# Patient Record
Sex: FEMALE | Race: OTHER | Employment: UNEMPLOYED | URBAN - METROPOLITAN AREA
[De-identification: names, ages, dates, MRNs, and addresses within clinical notes are randomized per-mention and may not be internally consistent; named-entity substitution may affect disease eponyms.]

---

## 2022-06-12 ENCOUNTER — HOSPITAL ENCOUNTER (EMERGENCY)
Age: 42
Discharge: HOME | End: 2022-06-12
Attending: EMERGENCY MEDICINE
Payer: MEDICAID

## 2022-06-12 ENCOUNTER — APPOINTMENT (OUTPATIENT)
Dept: GENERAL RADIOLOGY | Age: 42
End: 2022-06-12
Attending: PHYSICIAN ASSISTANT
Payer: MEDICAID

## 2022-06-12 VITALS
TEMPERATURE: 98.3 F | SYSTOLIC BLOOD PRESSURE: 107 MMHG | WEIGHT: 216.71 LBS | OXYGEN SATURATION: 99 % | RESPIRATION RATE: 18 BRPM | HEART RATE: 86 BPM | DIASTOLIC BLOOD PRESSURE: 72 MMHG | HEIGHT: 68 IN | BODY MASS INDEX: 32.84 KG/M2

## 2022-06-12 DIAGNOSIS — S89.91XA KNEE INJURY, RIGHT, INITIAL ENCOUNTER: Primary | ICD-10-CM

## 2022-06-12 DIAGNOSIS — S92.405A CLOSED NONDISPLACED FRACTURE OF PHALANX OF LEFT GREAT TOE, UNSPECIFIED PHALANX, INITIAL ENCOUNTER: ICD-10-CM

## 2022-06-12 DIAGNOSIS — M25.461 EFFUSION OF RIGHT KNEE JOINT: ICD-10-CM

## 2022-06-12 PROCEDURE — 73562 X-RAY EXAM OF KNEE 3: CPT

## 2022-06-12 PROCEDURE — 99283 EMERGENCY DEPT VISIT LOW MDM: CPT

## 2022-06-12 PROCEDURE — 73660 X-RAY EXAM OF TOE(S): CPT

## 2022-06-12 PROCEDURE — 74011250637 HC RX REV CODE- 250/637: Performed by: PHYSICIAN ASSISTANT

## 2022-06-12 RX ORDER — NAPROXEN 500 MG/1
500 TABLET ORAL
Qty: 20 TABLET | Refills: 0 | Status: SHIPPED | OUTPATIENT
Start: 2022-06-12

## 2022-06-12 RX ORDER — NAPROXEN 250 MG/1
500 TABLET ORAL
Status: COMPLETED | OUTPATIENT
Start: 2022-06-12 | End: 2022-06-12

## 2022-06-12 RX ADMIN — NAPROXEN 500 MG: 250 TABLET ORAL at 13:07

## 2022-06-12 NOTE — ED PROVIDER NOTES
38 yo F with no significant PMH here for evaluation after fall that occurred yesterday. States she slipped on water at home and injured Rt Knee and L great toe. Pain to same. Took Motrin yesterday. Denies striking head or neck; no LOC; no N/V or changes in mentation. The history is provided by the patient. Fall  This is a new problem. The current episode started yesterday. The problem occurs constantly. Pertinent negatives include no chest pain, no abdominal pain, no headaches and no shortness of breath. The symptoms are aggravated by walking, standing and exertion. Knee Pain  Pertinent negatives include no chest pain, no abdominal pain, no headaches and no shortness of breath. History reviewed. No pertinent past medical history. History reviewed. No pertinent surgical history. History reviewed. No pertinent family history. Social History     Socioeconomic History    Marital status: Not on file     Spouse name: Not on file    Number of children: Not on file    Years of education: Not on file    Highest education level: Not on file   Occupational History    Not on file   Tobacco Use    Smoking status: Never Smoker    Smokeless tobacco: Not on file   Substance and Sexual Activity    Alcohol use: Not on file    Drug use: Not on file    Sexual activity: Not on file   Other Topics Concern    Not on file   Social History Narrative    Not on file     Social Determinants of Health     Financial Resource Strain:     Difficulty of Paying Living Expenses: Not on file   Food Insecurity:     Worried About Running Out of Food in the Last Year: Not on file    Bina of Food in the Last Year: Not on file   Transportation Needs:     Lack of Transportation (Medical): Not on file    Lack of Transportation (Non-Medical):  Not on file   Physical Activity:     Days of Exercise per Week: Not on file    Minutes of Exercise per Session: Not on file   Stress:     Feeling of Stress : Not on file   Social Connections:     Frequency of Communication with Friends and Family: Not on file    Frequency of Social Gatherings with Friends and Family: Not on file    Attends Evangelical Services: Not on file    Active Member of Clubs or Organizations: Not on file    Attends Club or Organization Meetings: Not on file    Marital Status: Not on file   Intimate Partner Violence:     Fear of Current or Ex-Partner: Not on file    Emotionally Abused: Not on file    Physically Abused: Not on file    Sexually Abused: Not on file   Housing Stability:     Unable to Pay for Housing in the Last Year: Not on file    Number of [place] in the Last Year: Not on file    Unstable Housing in the Last Year: Not on file         ALLERGIES: Patient has no known allergies. Review of Systems   Constitutional: Negative. HENT: Negative for ear discharge. Eyes: Negative for photophobia, pain, discharge and visual disturbance. Respiratory: Negative for apnea, cough, chest tightness and shortness of breath. Cardiovascular: Negative for chest pain, palpitations and leg swelling. Gastrointestinal: Negative for abdominal distention, abdominal pain and blood in stool. Genitourinary: Negative for difficulty urinating, dysuria, flank pain, frequency and hematuria. Musculoskeletal: Positive for myalgias. Negative for back pain and neck pain. Skin: Negative for color change and pallor. Neurological: Negative for dizziness, syncope, weakness, numbness and headaches. Psychiatric/Behavioral: Negative for behavioral problems and confusion. The patient is not nervous/anxious. Vitals:    06/12/22 1239   BP: 107/72   Pulse: 86   Resp: 18   Temp: 98.3 °F (36.8 °C)   SpO2: 99%   Weight: 98.3 kg (216 lb 11.4 oz)   Height: 5' 8.11\" (1.73 m)            Physical Exam  Vitals and nursing note reviewed. Constitutional:       Appearance: She is well-developed. HENT:      Head: Normocephalic and atraumatic. Right Ear: External ear normal.      Left Ear: External ear normal.      Nose: Nose normal.   Eyes:      General:         Right eye: No discharge. Left eye: No discharge. Conjunctiva/sclera: Conjunctivae normal.      Pupils: Pupils are equal, round, and reactive to light. Cardiovascular:      Rate and Rhythm: Normal rate and regular rhythm. Heart sounds: Normal heart sounds. Pulmonary:      Effort: Pulmonary effort is normal.      Breath sounds: Normal breath sounds. Abdominal:      General: Bowel sounds are normal. There is no distension. Palpations: Abdomen is soft. Tenderness: There is no abdominal tenderness. There is no guarding or rebound. Musculoskeletal:         General: Swelling, tenderness and signs of injury present. Normal range of motion. Cervical back: Normal range of motion and neck supple. Right hip: Normal.      Right upper leg: Normal.      Right knee: Swelling present. Tenderness present. Normal pulse. Right ankle: Normal.      Left foot: Tenderness and bony tenderness present. No laceration. Normal pulse. Feet:    Skin:     General: Skin is warm and dry. Findings: No rash. Neurological:      Mental Status: She is alert and oriented to person, place, and time. Cranial Nerves: No cranial nerve deficit. Coordination: Coordination normal.   Psychiatric:         Behavior: Behavior normal.         Thought Content: Thought content normal.         Judgment: Judgment normal.          MDM  Number of Diagnoses or Management Options     Amount and/or Complexity of Data Reviewed  Tests in the radiology section of CPT®: ordered and reviewed  Discuss the patient with other providers: yes  Independent visualization of images, tracings, or specimens: yes           Procedures      Patient has been reassessed. Reviewed medications and radiographics with patient. Ready to discharge home. Aware of concern for ligamental injury;  Will follow up with Ortho. Discussed case with attending Physician. Agrees with care and will D/C with follow up. Patient's results have been reviewed with them. Patient and/or family have verbally conveyed their understanding and agreement of the patient's signs, symptoms, diagnosis, treatment and prognosis and additionally agree to follow up as recommended or return to the Emergency Room should their condition change prior to follow-up. Discharge instructions have also been provided to the patient with some educational information regarding their diagnosis as well a list of reasons why they would want to return to the ER prior to their follow-up appointment should their condition change.   GOGO Yarbrough

## 2022-06-12 NOTE — ED TRIAGE NOTES
Patient presents to triage via [facility]. Pt drove herself here and walked into department. Pt reports she slipped on water at her home yesterday. Pt complains of RIGHT knee pain, and LEFT great toe pain after fall. Denies LOC, denies hitting her head. Pt reports she took ibuprofen yesterday for pain with relief.  PMS intact

## 2022-06-13 ENCOUNTER — OFFICE VISIT (OUTPATIENT)
Dept: ORTHOPEDIC SURGERY | Age: 42
End: 2022-06-13
Payer: MEDICAID

## 2022-06-13 VITALS — WEIGHT: 200 LBS | BODY MASS INDEX: 30.31 KG/M2 | HEIGHT: 68 IN

## 2022-06-13 DIAGNOSIS — S83.241A ACUTE MEDIAL MENISCUS TEAR OF RIGHT KNEE, INITIAL ENCOUNTER: Primary | ICD-10-CM

## 2022-06-13 DIAGNOSIS — S92.415A NONDISPLACED FRACTURE OF PROXIMAL PHALANX OF LEFT GREAT TOE, INITIAL ENCOUNTER FOR CLOSED FRACTURE: ICD-10-CM

## 2022-06-13 PROCEDURE — 28490 TREAT BIG TOE FRACTURE: CPT | Performed by: ORTHOPAEDIC SURGERY

## 2022-06-13 PROCEDURE — 99203 OFFICE O/P NEW LOW 30 MIN: CPT | Performed by: ORTHOPAEDIC SURGERY

## 2022-06-13 RX ORDER — TRAMADOL HYDROCHLORIDE 50 MG/1
50 TABLET ORAL
Qty: 28 TABLET | Refills: 0 | Status: SHIPPED | OUTPATIENT
Start: 2022-06-13 | End: 2022-06-20

## 2022-06-13 NOTE — PROGRESS NOTES
Tommy Youssef (: 1980) is a 37 y.o. female, new patient, here for evaluation of the following chief complaint(s):  Knee Pain (right knee) and Toe Pain (left great toe)       ASSESSMENT/PLAN:  Below is the assessment and plan developed based on review of pertinent history, physical exam, labs, studies, and medications. We discussed conservative treatment for her great toe fracture. She may weight-bear as tolerated in her hard soled shoe. We discussed buddy taping. We will plan for MRI of the right knee which will help with surgical treatment planning. I do have concern for meniscus tear based on her exam and injury mechanism. 1. Acute medial meniscus tear of right knee, initial encounter  -     traMADoL (ULTRAM) 50 mg tablet; Take 1 Tablet by mouth every six (6) hours as needed for Pain for up to 7 days. Max Daily Amount: 200 mg., Normal, Disp-28 Tablet, R-0  -     MRI KNEE RT WO CONT; Future  2. Nondisplaced fracture of proximal phalanx of left great toe, initial encounter for closed fracture      Return for imaging results after study performed. SUBJECTIVE/OBJECTIVE:  Tommy Youssef (: 1980) is a 37 y.o. female. She notes a fall that occurred recently. She injured her left great toe and her right knee. She describes sharp aching pain in the right knee with pivoting type movements and with ambulation. She notes swelling that occurred as well. She has tried ice and anti-inflammatories with minimal relief. She notes occasional clicking and buckling symptoms. Her left great toe was found to have a fracture on x-ray. She has been in a [facility]. No Known Allergies    Current Outpatient Medications   Medication Sig    traMADoL (ULTRAM) 50 mg tablet Take 1 Tablet by mouth every six (6) hours as needed for Pain for up to 7 days. Max Daily Amount: 200 mg.    naproxen (NAPROSYN) 500 mg tablet Take 1 Tablet by mouth every twelve (12) hours as needed for Pain.      No current facility-administered medications for this visit. Social History     Socioeconomic History    Marital status:      Spouse name: Not on file    Number of children: Not on file    Years of education: Not on file    Highest education level: Not on file   Occupational History    Not on file   Tobacco Use    Smoking status: Never Smoker    Smokeless tobacco: Never Used   Vaping Use    Vaping Use: Never used   Substance and Sexual Activity    Alcohol use: Never    Drug use: Never    Sexual activity: Not on file   Other Topics Concern    Not on file   Social History Narrative    Not on file     Social Determinants of Health     Financial Resource Strain:     Difficulty of Paying Living Expenses: Not on file   Food Insecurity:     Worried About [employer] [employer] in the Last Year: Not on file    Bina of Food in the Last Year: Not on file   Transportation Needs:     Lack of Transportation (Medical): Not on file    Lack of Transportation (Non-Medical): Not on file   Physical Activity:     Days of Exercise per Week: Not on file    Minutes of Exercise per Session: Not on file   Stress:     Feeling of Stress : Not on file   Social Connections:     Frequency of Communication with Friends and Family: Not on file    Frequency of Social Gatherings with Friends and Family: Not on file    Attends Sikhism Services: Not on file    Active Member of [address] [place] [address] or Organizations: Not on file    Attends Club or Organization Meetings: Not on file    Marital Status: Not on file   Intimate Partner Violence:     Fear of Current or Ex-Partner: Not on file    Emotionally Abused: Not on file    Physically Abused: Not on file    Sexually Abused: Not on file   Housing Stability:     Unable to Pay for Housing in the Last Year: Not on file    Number of [place] in the Last Year: Not on file    Unstable Housing in the Last Year: Not on file       History reviewed.  No pertinent surgical history. History reviewed. No pertinent family history. OB History    No obstetric history on file. REVIEW OF SYSTEMS:    Patient denies any recent fever, chills, nausea, vomiting, chest pain, or shortness of breath. Vitals:  Ht 5' 8\" (1.727 m)   Wt 200 lb (90.7 kg)   BMI 30.41 kg/m²    Body mass index is 30.41 kg/m². PHYSICAL EXAM:  General exam: Patient is awake, alert, and oriented x3. Well-appearing. No acute distress. Ambulates with an antalgic gait    Right knee: Neurovascular and sensory intact. There is tenderness to palpation along the medial joint line. Mild effusion is present. There is pain with Milli's maneuver. No obvious instability on ligamentous testing including Lachman's exam.  Stable anterior and posterior drawer. There is ecchymosis at the anterior knee. Left foot: There is tenderness palpation at the great toe. Pain in this area with range of motion. Mild swelling    IMAGING:    XR Results (most recent):  Results from Hospital Encounter encounter on 06/12/22    XR GREAT TOE LT MIN 2 V    Narrative  EXAM: XR GREAT TOE LT MIN 2 V    INDICATION: fall. COMPARISON: None. FINDINGS: Three views of the left great toe demonstrate an acute nondisplaced  obliquely oriented fracture through the distal shaft of the proximal phalanx of  the great toe. There is extension into the IP joint. There is overlying soft  tissue swelling    Impression  Acute intra-articular nondisplaced fracture of the proximal phalanx  of the great toe. XR KNEE RT 3 V    Narrative  EXAM: XR KNEE RT 3 V    INDICATION: fall. COMPARISON: None. FINDINGS: Three views of the right knee demonstrate no fracture or other acute  osseous or articular abnormality. There is a small joint effusion. 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 Impression  Small joint effusion with DJD. No fracture.          Orders Placed This Encounter    MRI KNEE RT WO CONT     Standing Status:   Future Standing Expiration Date:   7/13/2022     Order Specific Question:   Arthrogram study     Answer:   No     Order Specific Question:   Is Patient Pregnant? Answer:   No     Order Specific Question:   Reason for Exam     Answer:   RIGHT KNEE PAIN    traMADoL (ULTRAM) 50 mg tablet     Sig: Take 1 Tablet by mouth every six (6) hours as needed for Pain for up to 7 days. Max Daily Amount: 200 mg. Dispense:  28 Tablet     Refill:  0              An electronic signature was used to authenticate this note.   -- Keturah Katz, DO

## 2022-06-23 ENCOUNTER — OFFICE VISIT (OUTPATIENT)
Dept: ORTHOPEDIC SURGERY | Age: 42
End: 2022-06-23
Payer: MEDICAID

## 2022-06-23 VITALS — HEIGHT: 68 IN | WEIGHT: 200 LBS | BODY MASS INDEX: 30.31 KG/M2

## 2022-06-23 DIAGNOSIS — S83.511D NEW ACL TEAR, RIGHT, SUBSEQUENT ENCOUNTER: Primary | ICD-10-CM

## 2022-06-23 PROCEDURE — 99213 OFFICE O/P EST LOW 20 MIN: CPT | Performed by: ORTHOPAEDIC SURGERY

## 2022-06-23 NOTE — PROGRESS NOTES
Bret Hugo (: 1980) is a 37 y.o. female, established patient, here for evaluation of the following chief complaint(s):  Knee Pain       ASSESSMENT/PLAN:  Below is the assessment and plan developed based on review of pertinent history, physical exam, labs, studies, and medications. We discussed trying a trial of conservative treatment based on her MRI. If she has recurrent buckling symptoms we did discuss possibility of right knee arthroscopy with chondroplasty and possible ACL repair. 1. New ACL tear, right, subsequent encounter      Return if symptoms worsen or fail to improve. SUBJECTIVE/OBJECTIVE:  Bret Hugo (: 1980) is a 37 y.o. female. She notes that her knee has become less painful since her previous visit. She is starting ambulate without the crutch at times. She notes occasional clicking and aching pain in the knee. No Known Allergies    Current Outpatient Medications   Medication Sig    naproxen (NAPROSYN) 500 mg tablet Take 1 Tablet by mouth every twelve (12) hours as needed for Pain. No current facility-administered medications for this visit.        Social History     Socioeconomic History    Marital status:      Spouse name: Not on file    Number of children: Not on file    Years of education: Not on file    Highest education level: Not on file   Occupational History    Not on file   Tobacco Use    Smoking status: Never Smoker    Smokeless tobacco: Never Used   Vaping Use    Vaping Use: Never used   Substance and Sexual Activity    Alcohol use: Never    Drug use: Never    Sexual activity: Not on file   Other Topics Concern    Not on file   Social History Narrative    Not on file     Social Determinants of Health     Financial Resource Strain:     Difficulty of Paying Living Expenses: Not on file   Food Insecurity:     Worried About Running Out of Food in the Last Year: Not on file    Bina of Food in the Last Year: Not on file Transportation Needs:     Lack of Transportation (Medical): Not on file    Lack of Transportation (Non-Medical): Not on file   Physical Activity:     Days of Exercise per Week: Not on file    Minutes of Exercise per Session: Not on file   Stress:     Feeling of Stress : Not on file   Social Connections:     Frequency of Communication with Friends and Family: Not on file    Frequency of Social Gatherings with Friends and Family: Not on file    Attends [employer] Services: Not on file    Active Member of [address] [place] [address] [employer] or Organizations: Not on file    Attends Club or Organization Meetings: Not on file    Marital Status: Not on file   Intimate Partner Violence:     Fear of Current or Ex-Partner: Not on file    Emotionally Abused: Not on file    Physically Abused: Not on file    Sexually Abused: Not on file   Housing Stability:     Unable to Pay for Housing in the Last Year: Not on file    Number of [place] in the Last Year: Not on file    Unstable Housing in the Last Year: Not on file       History reviewed. No pertinent surgical history. History reviewed. No pertinent family history. OB History    No obstetric history on file. REVIEW OF SYSTEMS:  ROS     Positive for: Musculoskeletal    Last edited by Hitesh Lim on 6/23/2022  8:03 AM. (History)        Patient denies any recent fever, chills, nausea, vomiting, chest pain, or shortness of breath. Vitals:  Ht 5' 8\" (1.727 m)   Wt 200 lb (90.7 kg)   BMI 30.41 kg/m²    Body mass index is 30.41 kg/m². PHYSICAL EXAM:  General exam: Patient is awake, alert, and oriented x3. Well-appearing. No acute distress. Ambulates with a normal gait. Right knee: She does continue with some medial joint line tenderness palpation and pain with Milli's exam.  No significant effusion noted today.   Minimal laxity is noted on Lachman's exam.    IMAGING:  MRI Results (most recent):  Results from Appointment encounter on 06/17/22    MRI KNEE RT WO CONT    Narrative  EXAM: MRI KNEE RT WO CONT    INDICATION: Right knee pain    COMPARISON: Radiographs 6/12/2022    TECHNIQUE: Axial T2 fat-saturated and proton density fat-saturated; coronal T1  and proton density fat-saturated; and sagittal T2 fat-saturated, proton density  fat-saturated, and gradient echo MRI of the right knee . CONTRAST: None. FINDINGS: Bone marrow: Small bone marrow contusions are seen in the posterior  nonweightbearing lateral femoral condyle and posterior lateral tibial plateau. No acute fracture, dislocation, or marrow replacing process. Joint fluid: Mild knee joint effusion. Collateral ligaments and posterior, lateral corner: Significant edema surrounds  the proximal MCL. There is disruption of the deeper fibers of the MCL. Medial meniscus: Intact. Lateral meniscus: Intact. ACL and PCL: A portion of the proximal ACL has a rounded appearance on series 8  image 17. The remaining fibers appear intact. Tendons: Intact. Muscles: Within normal limits. Patellofemoral alignment: There is mild lateral tilting and subluxation of the  patella. The femoral trochlea is not hypoplastic. TT-TG distance: 15 mm    Articular cartilage: Partial-thickness fissuring is seen in the mid vertical  ridge of the patella. Soft tissue mass: None. Impression  1. Partial injury of the proximal ACL. 2. Mild bone marrow contusions in the posterior nonweightbearing lateral femoral  condyle and posterior lateral tibial plateau. 3. Grade 2 proximal MCL sprain. 4. Mild knee joint effusion. XR Results (most recent):  Results from Hospital Encounter encounter on 06/12/22    XR GREAT TOE LT MIN 2 V    Narrative  EXAM: XR GREAT TOE LT MIN 2 V    INDICATION: fall. COMPARISON: None. FINDINGS: Three views of the left great toe demonstrate an acute nondisplaced  obliquely oriented fracture through the distal shaft of the proximal phalanx of  the great toe.  There is extension into the IP joint. There is overlying soft  tissue swelling    Impression  Acute intra-articular nondisplaced fracture of the proximal phalanx  of the great toe. XR KNEE RT 3 V    Narrative  EXAM: XR KNEE RT 3 V    INDICATION: fall. COMPARISON: None. FINDINGS: Three views of the right knee demonstrate no fracture or other acute  osseous or articular abnormality. There is a small joint effusion. 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 Impression  Small joint effusion with DJD. No fracture. No orders of the defined types were placed in this encounter. An electronic signature was used to authenticate this note.   -- Zara Pruett DO